# Patient Record
Sex: FEMALE | Race: WHITE
[De-identification: names, ages, dates, MRNs, and addresses within clinical notes are randomized per-mention and may not be internally consistent; named-entity substitution may affect disease eponyms.]

---

## 2018-06-26 ENCOUNTER — HOSPITAL ENCOUNTER (EMERGENCY)
Dept: HOSPITAL 80 - FED | Age: 23
Discharge: HOME | End: 2018-06-26
Payer: COMMERCIAL

## 2018-06-26 VITALS — DIASTOLIC BLOOD PRESSURE: 91 MMHG | SYSTOLIC BLOOD PRESSURE: 128 MMHG
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
## 2018-06-26 DIAGNOSIS — S82.862A: Primary | ICD-10-CM
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
## 2018-06-26 NOTE — EDPHY
H & P


Time Seen by Provider: 06/26/18 11:43


HPI/ROS: 





CHIEF COMPLAINT:  Left ankle pain





HISTORY OF PRESENT ILLNESS:  Walking in the [place] bath when stepped 

wrong and felt a crack with left ankle pain.  Mild at rest but severe with 

weight-bearing, brought in by EMS.  Not a trauma activation.


Not associated with weakness or numbness in the foot or laceration or bleeding.

  No knee or hip injury.





REVIEW OF SYSTEMS:


Eye: no change in vision


ENT:  No ENT symptoms


Cardiac:  No chest pain


Pulmonary:  Not short of breath


Abdomen:  No abdominal pain


Musculoskeletal:  HPI


Skin:  Bruising on the medial side of the left ankle but no laceration


Neuro:  No weakness or numbness in the left foot


Constitutional: no fever


:  No symptoms





A comprehensive 10 point review of systems is otherwise negative aside from 

elements mentioned in the history of present illness.





PAST MEDICAL HISTORY:  Factor 5 Leiden diagnosed when her sister had a venous 

thromboembolism





Social history:  Lives in [place]





General Appearance: Alert and conversant, cooperative.


Eyes: No scleral  icterus. 


ENT, Mouth: Normal mucous membranes.


Respiratory: Normal respiratory effort, breath sounds equal, lungs are clear to 

auscultation.


Cardiovascular:  Regular rate and rhythm.


Gastrointestinal:  Abdomen is soft and non tender.


Neurological: Alert, face symmetric, normal motor and sensory in extremities. 


Skin:  Bruising on the medial side of the left ankle with no laceration


Musculoskeletal:  Proximal tib-fib tenderness on the left, normal knee.  

Tenderness and swelling on the left ankle.  Normal left foot with normal motor 

sensory and vascular in the left foot.


Psychiatric: Not agitated.





Emergency Department course/MDM:





Receive fentanyl pre-hospital.  Ice pack and x-rays discussed and consented.





1231:  X-ray shows Maisonneuve fracture with displaced medial malleolar 

fracture and wide mortise.


Results reviewed with the patient and family on the computer system, warned she 

needs outpatient follow-up for ORIF.


Long leg splint placed and crutches and nonweightbearing.





Constitutional: 


 Initial Vital Signs











Temperature (C)  36.6 C   06/26/18 11:50


 


Heart Rate  95   06/26/18 11:50


 


Respiratory Rate  18   06/26/18 11:50


 


Blood Pressure  124/85 H  06/26/18 11:50


 


O2 Sat (%)  97   06/26/18 11:50








 











O2 Delivery Mode               Room Air














Allergies/Adverse Reactions: 


 





No Known Allergies Allergy (Unverified 06/26/18 11:52)


 








Home Medications: 














 Medication  Instructions  Recorded


 


Hydrocodone/APAP 5/325 [Norco 1 tab PO Q4-6PRN PRN #11 tab 06/26/18





5/325]  














Medical Decision Making





- Diagnostics


Imaging Results: 


 Imaging Impressions





Ankle X-Ray  06/26/18 11:52


Impression:


1. Mildly displaced medial malleolar fracture with lateral subluxation of the 

talus.


2. Nondisplaced posterior malleolar fracture.


3. Moderately comminuted mildly displaced distal fibular diaphyseal fracture.








Tibia/Fibula X-Ray  06/26/18 11:52


Impression: 


1. Comminuted distal fibular diaphyseal fracture.


2. Nondisplaced posterior malleolar fracture.


3. Mildly displaced medial malleolar fracture.


 


Findings discussed with CHELLY PEREZ 6/26/2018 at 12:43. 


 


 








Maisonneuve fracture left ankle.


Imaging: I viewed and interpreted images myself


Procedures: 





Procedure:  Splint placement.


A left long leg posterior Ortho Glass splint was applied.  After application of 

the splint I returned and re-examined the patient at 1:10 p.m..  The splint was 

adequately immobilizing the joint and distal to the splint the patient's 

circulation and sensation was intact.





- Data Points


Medications Given: 


 








Discontinued Medications





Hydrocodone Bitart/Acetaminophen (Norco 5/325)  1 tab PO EDNOW ONE


   Stop: 06/26/18 12:38


   Last Admin: 06/26/18 12:50 Dose:  1 tab








Departure





- Departure


Disposition: Home, Routine, Self-Care


Clinical Impression: 


Maisonneuve fracture of left lower extremity


Qualifiers:


 Encounter type: initial encounter Fracture type: closed Fracture alignment: 

displaced Qualified Code(s): S82.862A - Displaced Maisonneuve's fracture of 

left leg, initial encounter for closed fracture





Condition: Good


Instructions:  Crutch Instructions (ED), Splint Care (ED)


Additional Instructions: 


Non weight bearing left leg, use crutches. Followup with orthopedics (referral 

given) 1--2 days in the office


Referrals: 


Perico Grubbs MD [Medical Doctor] - As per Instructions


Prescriptions: 


Hydrocodone/APAP 5/325 [Norco 5/325] 1 tab PO Q4-6PRN PRN #11 tab


 PRN Reason: For Pain